# Patient Record
(demographics unavailable — no encounter records)

---

## 2024-11-27 NOTE — HISTORY OF PRESENT ILLNESS
[de-identified] : Todd Black is a 4 yo male who presents for evaluation of recurrent otitis media. He has had recurrent ear infections since infancy. His mother notes that his most recent ear infection was 1.5 weeks ago and he was treated with antibiotics. Prior to this, he had an ear infection in Dec 2022. In the past year, he has had 3-4 ear infections, each of those treated with antibiotics. His mother denies otorrhea. Todd denies tinnitus or dizziness. His mother is not concerned about his hearing. He has frequent nasal congestion and tends to breathe through his mouth. He intermittently snores at night but he has not had witnessed pauses in breathing at night. He had a fever last week.   Interval History: 3/23/23 - Todd presents s/p adenoidectomy and bilateral tympanostomy tube placement on 3/2/23. He did well after surgery. He had drainage the day after surgery which resolved. No otalgia. His mother feels that his hearing is better. She denies imbalance. No fevers or chills. She notes improved nasal breathing. She denies rhinorrhea. His mother has not heard any snoring. [de-identified] : 8/23/23 - Todd presents s/p adenoidectomy and bilateral tympanostomy tube placement on 3/2/23. He has done well since. His mother denies any ear infections. She denies otorrhea. He had one time episode of otalgia that resolved. No fevers or chills. She denies nasal congestion or drainage.  2/21/24 - Todd presents for follow-up. He is s/p adenoidectomy and bilateral tympanostomy tube placement on 3/2/23. He continues to do well. His mother denies otalgia, otorrhea, balance issues, hearing concerns, or recent ear infections. allergy testing was positive for dust. She notes some nasal congestion which improves with flonase. No recent fevers or chills. His mother denies snoring or pauses in breathing.  8/23/24 - Todd presents for follow-up. His mother denies otalgia, otorrhea, recent ear infections, or hearing concerns. No recent fevers. Denies nasal congestion or snoring.  11/27/24 - Todd presents for follow up. His mother denies any otalgia, otorrhea, recent ear infection. She denies any hearing concerns. Denies any nasal congesion, mouth breathing or snoring.

## 2024-11-27 NOTE — REASON FOR VISIT
[Subsequent Evaluation] : a subsequent evaluation for [FreeTextEntry2] : Eustachian tube dysfunction

## 2024-11-27 NOTE — ASSESSMENT
[FreeTextEntry1] : Todd Black presents for follow-up of chronic rhinitis and eustachian tube dysfunction. He is s/p adenoidectomy and bilateral tympanostomy tube placement on 3/2/23. Postop audiogram showed large ECV AU and normal hearing AU. He has not had any ear infections. Allergy testing was positive for dustmites. Left extruded tube was removed today. Right tube previously extruded.   - f/u prn

## 2024-11-27 NOTE — PHYSICAL EXAM
[Exposed Vessel] : left anterior vessel not exposed [2+] : 2+ [Clear to Auscultation] : lungs were clear to auscultation bilaterally [Wheezing] : no wheezing [Increased Work of Breathing] : no increased work of breathing with use of accessory muscles and retractions [Normal Gait and Station] : normal gait and station [Normal muscle strength, symmetry and tone of facial, head and neck musculature] : normal muscle strength, symmetry and tone of facial, head and neck musculature [Normal] : no cervical lymphadenopathy [Age Appropriate Behavior] : age appropriate behavior [Cooperative] : cooperative [FreeTextEntry9] : Extruded tube removed

## 2025-04-02 NOTE — DATA REVIEWED
[FreeTextEntry1] : [place] questionnaires were previously completed by parents and teacher.    Parents responses:  Inattention 7/9   Hyperactivity 7/9   ODD: 2/8.   Conduct disorder: 0/14   Anxiety/ Depression: 0/7   Teachers responses:   Inattention 9/9.    Hyperactivity 6/9   ODD/ Conduct: 1/10  Anxiety/ Depression: 1/7   + ADHD combined type Performance questions: Parents: Areas of concern include overall school performance, reading, writing, math, relationship with siblings, and participation in organized activities. Teachers: Areas of concern include reading, writing, following directions, disrupting class, assignment completion, and organizational skills.

## 2025-04-02 NOTE — PHYSICAL EXAM
[Well-appearing] : well-appearing [Normocephalic] : normocephalic [No dysmorphic facial features] : no dysmorphic facial features [Straight] : straight [No deformities] : no deformities [Alert] : alert [Well related, good eye contact] : well related, good eye contact [Conversant] : conversant [Normal speech and language] : normal speech and language [Follows instructions well] : follows instructions well [No facial asymmetry or weakness] : no facial asymmetry or weakness [Gross hearing intact] : gross hearing intact stated [Gets up on table without difficulty] : gets up on table without difficulty [No abnormal involuntary movements] : no abnormal involuntary movements [Walks and runs well] : walks and runs well [Good walking balance] : good walking balance [Normal gait] : normal gait

## 2025-04-02 NOTE — PLAN
[FreeTextEntry1] : - Adderall XR 10 mg daily in the morning - Discussed use of medication as well as side effects  - Follow up 6 months yes

## 2025-04-02 NOTE — PLAN
[FreeTextEntry1] : - Adderall XR 10 mg daily in the morning - Discussed use of medication as well as side effects  - Follow up 6 months

## 2025-04-02 NOTE — HISTORY OF PRESENT ILLNESS
[FreeTextEntry1] : ANGELA ROBERTSON is a 7 year old male with a pmhx of ADHD currently on Adderall XR 10 mg daily here for a follow up.      Initial Evaluation:  Educational assessment:  Current Grade: K Current District: [place]  Angela is currently in a general education classroom with an IEP for speech with a 2:1 aid. He was initially evaluated by EI and received speech therapy starting at age 4. Teachers have said that Angela has difficulty sitting still and paying attention in class. He requires frequent redirection and refocusing. He is unable to complete a task without support. He was recently evaluated by OT which showed that he is sensory seeking and a behavioral plan was put in placed. After his evaluation by OT, they gave him a special chair with a band to move his feet. Academically he is behind grade level and is particularly struggling with reading. He gets reading pull out in school. He struggles with word retrieval and pronunciation. Angela gets very frustrated when it comes to academics and this is when his behaviors worsen. Mother says that since implementing behavioral plan in school he has improved slightly.   Mother reports that at home Angela is unable to sit still. He is constantly moving all day long. He struggles with paying attention and he has difficulty following directions. Mother says she must repeat tasks multiple times and help him along the way in order to complete.   Socially, mother reports that Angela plays well with other kids and enjoys social interaction.  No concern for anxiety, depression, OCD.  Denies any issues with sleep initiation or maintaining sleep throughout the night. Denies any parasomnias or restlessness while asleep.  Denies staring, twitching, seizure or seizure-like activity. No serious head injury, meningoencephalitis.   [place] questionnaires were previously completed by parents and teacher.    Parents responses:  Inattention 7/9   Hyperactivity 7/9   ODD: 2/8.   Conduct disorder: 0/14   Anxiety/ Depression: 0/7   Teachers responses:   Inattention 9/9.    Hyperactivity 6/9   ODD/ Conduct: 1/10  Anxiety/ Depression: 1/7   Performance questions: Parents: Areas of concern include overall school performance, reading, writing, math, relationship with siblings, and participation in organized activities. Teachers: Areas of concern include reading, writing, following directions, disrupting class, assignment completion, and organizational skills.

## 2025-04-02 NOTE — DATA REVIEWED
[FreeTextEntry1] : [place] questionnaires were previously completed by parents and teacher.    Parents responses:  Inattention 7/9   Hyperactivity 7/9   ODD: 2/8.   Conduct disorder: 0/14   Anxiety/ Depression: 0/7   Teachers responses:   Inattention 9/9.    Hyperactivity 6/9   ODD/ Conduct: 1/10  Anxiety/ Depression: 1/7   + ADHD combined type Performance questions: Parents: Areas of concern include overall school performance, reading, writing, math, relationship with siblings, and participation in organized activities. Teachers: Areas of concern include reading, writing, following directions, disrupting class, assignment completion, and organizational skills.

## 2025-04-02 NOTE — ASSESSMENT
[FreeTextEntry1] : ANGELA is a 7 year old with ADHD on Adderall here with mother for a follow up. Currently in a general education classroom with an IEP and receives ST and OT. Reportedly doing well on current medication regimen. Denies any negative side effects. Will continue current dose of stimulant medication for ADHD.

## 2025-04-02 NOTE — CONSULT LETTER
[Dear  ___] : Dear  [unfilled], [Consult Letter:] : I had the pleasure of evaluating your patient, [unfilled]. [Please see my note below.] : Please see my note below. [Consult Closing:] : Thank you very much for allowing me to participate in the care of this patient.  If you have any questions, please do not hesitate to contact me. [Sincerely,] : Sincerely, [FreeTextEntry3] : Dixie Grullon, MARTIN-BC Board Certified Family Nurse Practitioner  Pediatric Neurology  [facility] [address] [facility] [address] [place] [address] Tel: [phone] Fax: [phone]

## 2025-04-02 NOTE — HISTORY OF PRESENT ILLNESS
[FreeTextEntry1] : ANGELA ROBERTSON is a 7 year old male with a pmhx of ADHD currently on Adderall XR 10 mg daily here for a follow up.      Initial Evaluation:  Educational assessment:  Current Grade: K Current District: [place]  Angela is currently in a general education classroom with an IEP for speech with a 2:1 aid. He was initially evaluated by EI and received speech therapy starting at age 4. Teachers have said that Angela has difficulty sitting still and paying attention in class. He requires frequent redirection and refocusing. He is unable to complete a task without support. He was recently evaluated by OT which showed that he is sensory seeking and a behavioral plan was put in placed. After his evaluation by OT, they gave him a special chair with a band to move his feet. Academically he is behind grade level and is particularly struggling with reading. He gets reading pull out in school. He struggles with word retrieval and pronunciation. Angela gets very frustrated when it comes to academics and this is when his behaviors worsen. Mother says that since implementing behavioral plan in school he has improved slightly.   Mother reports that at home Angela is unable to sit still. He is constantly moving all day long. He struggles with paying attention and he has difficulty following directions. Mother says she must repeat tasks multiple times and help him along the way in order to complete.   Socially, mother reports that Angela plays well with other kids and enjoys social interaction.  No concern for anxiety, depression, OCD.  Denies any issues with sleep initiation or maintaining sleep throughout the night. Denies any parasomnias or restlessness while asleep.  Denies staring, twitching, seizure or seizure-like activity. No serious head injury, meningoencephalitis.   [place] questionnaires were previously completed by parents and teacher.    Parents responses:  Inattention 7/9   Hyperactivity 7/9   ODD: 2/8.   Conduct disorder: 0/14   Anxiety/ Depression: 0/7   Teachers responses:   Inattention 9/9.    Hyperactivity 6/9   ODD/ Conduct: 1/10  Anxiety/ Depression: 1/7   Performance questions: Parents: Areas of concern include overall school performance, reading, writing, math, relationship with siblings, and participation in organized activities. Teachers: Areas of concern include reading, writing, following directions, disrupting class, assignment completion, and organizational skills.

## 2025-04-02 NOTE — BIRTH HISTORY
[At Term] : at term [place] [Normal Vaginal Route] : by normal vaginal route [None] : there were no delivery complications [Age Appropriate] : age appropriate developmental milestones not met [Speech Delay w/ Normal Development] : patient has speech delay with normal development

## 2025-04-02 NOTE — CONSULT LETTER
[Dear  ___] : Dear  [unfilled], [Consult Letter:] : I had the pleasure of evaluating your patient, [unfilled]. [Please see my note below.] : Please see my note below. [Consult Closing:] : Thank you very much for allowing me to participate in the care of this patient.  If you have any questions, please do not hesitate to contact me. [Sincerely,] : Sincerely, [FreeTextEntry3] : Dixie Grullon, MARTIN-BC Board Certified Family Nurse Practitioner  Pediatric Neurology  [facility] [address] [facility] [address] [place] [address] Tel: [phone] Fax: [phone]